# Patient Record
Sex: FEMALE | URBAN - NONMETROPOLITAN AREA
[De-identification: names, ages, dates, MRNs, and addresses within clinical notes are randomized per-mention and may not be internally consistent; named-entity substitution may affect disease eponyms.]

---

## 2022-01-14 ENCOUNTER — OFFICE VISIT (OUTPATIENT)
Dept: URBAN - NONMETROPOLITAN AREA CLINIC 6 | Facility: CLINIC | Age: 47
End: 2022-01-14
Payer: COMMERCIAL

## 2022-01-14 DIAGNOSIS — H52.03 HYPERMETROPIA, BILATERAL: Primary | ICD-10-CM

## 2022-01-14 PROCEDURE — 92004 COMPRE OPH EXAM NEW PT 1/>: CPT | Performed by: STUDENT IN AN ORGANIZED HEALTH CARE EDUCATION/TRAINING PROGRAM

## 2022-01-14 ASSESSMENT — INTRAOCULAR PRESSURE
OD: 16
OS: 16

## 2022-01-14 ASSESSMENT — KERATOMETRY
OS: 44.03
OD: 44.01

## 2022-01-14 ASSESSMENT — VISUAL ACUITY
OS: 20/25
OD: 20/25

## 2022-01-14 NOTE — IMPRESSION/PLAN
Impression: Hypermetropia, bilateral: H52.03. Plan: Patient educated on refractive error. New glasses prescription dispensed to patient, expires 1 year. Adaptation period explained.

## 2025-03-25 ENCOUNTER — OFFICE VISIT (OUTPATIENT)
Dept: URBAN - NONMETROPOLITAN AREA CLINIC 6 | Facility: CLINIC | Age: 50
End: 2025-03-25
Payer: COMMERCIAL

## 2025-03-25 DIAGNOSIS — H52.03 HYPERMETROPIA, BILATERAL: Primary | ICD-10-CM

## 2025-03-25 PROCEDURE — 92014 COMPRE OPH EXAM EST PT 1/>: CPT | Performed by: OPTOMETRIST

## 2025-03-25 ASSESSMENT — INTRAOCULAR PRESSURE
OD: 16
OD: 21
OS: 16
OS: 22

## 2025-03-25 ASSESSMENT — VISUAL ACUITY
OS: 20/25
OD: 20/20

## 2025-03-25 ASSESSMENT — KERATOMETRY
OD: 43.95
OS: 43.86